# Patient Record
Sex: FEMALE | Race: WHITE | Employment: OTHER | URBAN - METROPOLITAN AREA
[De-identification: names, ages, dates, MRNs, and addresses within clinical notes are randomized per-mention and may not be internally consistent; named-entity substitution may affect disease eponyms.]

---

## 2020-02-18 ENCOUNTER — HOSPITAL ENCOUNTER (OUTPATIENT)
Dept: CT IMAGING | Age: 45
Discharge: HOME | End: 2020-02-18
Payer: COMMERCIAL

## 2020-02-18 DIAGNOSIS — R10.9 FLANK PAIN: ICD-10-CM

## 2020-02-18 DIAGNOSIS — R31.9 HEMATURIA: ICD-10-CM

## 2020-02-18 PROCEDURE — 74178 CT ABD&PLV WO CNTR FLWD CNTR: CPT

## 2020-02-18 RX ORDER — SODIUM CHLORIDE 0.9 % (FLUSH) 0.9 %
10 SYRINGE (ML) INJECTION
Status: COMPLETED | OUTPATIENT
Start: 2020-02-18 | End: 2020-02-18

## 2020-02-18 RX ADMIN — Medication 10 ML: at 13:24

## 2020-03-04 ENCOUNTER — HOSPITAL ENCOUNTER (OUTPATIENT)
Dept: MRI IMAGING | Age: 45
Discharge: HOME | End: 2020-03-04
Payer: COMMERCIAL

## 2020-03-04 DIAGNOSIS — R22.41 MASS OF RIGHT THIGH: ICD-10-CM

## 2020-03-04 PROCEDURE — 73720 MRI LWR EXTREMITY W/O&W/DYE: CPT

## 2020-03-04 RX ORDER — GADOTERATE MEGLUMINE 376.9 MG/ML
14 INJECTION INTRAVENOUS
Status: COMPLETED | OUTPATIENT
Start: 2020-03-04 | End: 2020-03-04

## 2020-03-04 RX ADMIN — GADOTERATE MEGLUMINE 14 ML: 376.9 INJECTION INTRAVENOUS at 11:00

## 2021-05-14 ENCOUNTER — TRANSCRIBE ORDER (OUTPATIENT)
Dept: SCHEDULING | Age: 46
End: 2021-05-14

## 2021-05-14 DIAGNOSIS — M24.851 PARALABRAL CYST OF HIP, RIGHT: Primary | ICD-10-CM

## 2021-05-21 ENCOUNTER — HOSPITAL ENCOUNTER (OUTPATIENT)
Dept: MRI IMAGING | Age: 46
Discharge: HOME | End: 2021-05-21
Payer: COMMERCIAL

## 2021-05-21 DIAGNOSIS — M24.851 PARALABRAL CYST OF HIP, RIGHT: ICD-10-CM

## 2021-05-21 PROCEDURE — 73721 MRI JNT OF LWR EXTRE W/O DYE: CPT | Performed by: ORTHOPAEDIC SURGERY

## 2022-03-01 ENCOUNTER — OFFICE VISIT (OUTPATIENT)
Dept: ORTHOPEDIC SURGERY | Age: 47
End: 2022-03-01
Payer: COMMERCIAL

## 2022-03-01 VITALS — WEIGHT: 150 LBS | HEIGHT: 68 IN | BODY MASS INDEX: 22.73 KG/M2

## 2022-03-01 DIAGNOSIS — M25.512 CHRONIC LEFT SHOULDER PAIN: Primary | ICD-10-CM

## 2022-03-01 DIAGNOSIS — G89.29 CHRONIC LEFT SHOULDER PAIN: Primary | ICD-10-CM

## 2022-03-01 DIAGNOSIS — M75.42 IMPINGEMENT SYNDROME OF LEFT SHOULDER: ICD-10-CM

## 2022-03-01 PROCEDURE — 99203 OFFICE O/P NEW LOW 30 MIN: CPT | Performed by: ORTHOPAEDIC SURGERY

## 2022-03-01 RX ORDER — MELOXICAM 7.5 MG/1
7.5 TABLET ORAL DAILY
Qty: 30 TABLET | Refills: 0 | Status: SHIPPED | OUTPATIENT
Start: 2022-03-01 | End: 2022-10-11

## 2022-03-01 RX ORDER — TRAZODONE HYDROCHLORIDE 50 MG/1
TABLET ORAL
COMMUNITY

## 2022-03-01 NOTE — PATIENT INSTRUCTIONS
From the [id number] [facility]    Rotator Cuff Injury    The rotator cuff is the group of four muscles and tendons that surround the shoulder joint. The four muscles are called the supraspinatus, infraspinatus, subscapularis, and teres minor. The most common tendon to have a problem is the supraspinatus. On top of the rotator cuff is the deltoid muscle. The bones at the shoulder include the humerus, scapula (shoulder blade), and clavicle (collar bone). The rotator cuff muscles start on the shoulder blade and attach around the front, top, and back of the humerus. They help to elevate the arm away from the body in forward, side, and backward motions. They also turn the arm in or out to rotate the shoulder. These muscles and tendons provide strength and stability to the shoulder joint. Above the rotator cuff there is a bursa - a sack of tissue that cushions and protects two surfaces so that they do not rub directly against each other when they move. The rotator cuff bursa protects close contact between bones around the shoulder (Figure 1). When the rotator cuff is injured or damaged, it can lead to inflammation of the bursa, called bursitis. Bursitis can cause pain and loss of motion. Figure 1  Shoulder anatomy, shown here, is important to understand with a rotator cuff injury. Causes    Rotator cuff injuries can result from trauma (such as falls), sports injuries, or motor vehicle accidents. However, most rotator cuff injuries result from natural tendon aging and gradual breakdown of the cuff. Tendinosis is a process where the tendon develops microscopic changes in its collagen and cells without inflammation. In some people this process can cause symptoms. In other people, it may be occurring silently. Gradually, there are enough structural changes that it is easier to tear the weakened tendon doing an routine activity. They are more common in older people for this reason.     Signs and Symptoms    Damage to the rotator cuff is usually called a \"tear. \" Tears can be small or large, and the size and location of the tear determines the symptoms. Symptoms can include:    Pain  Weakness  Limited motion  Catching  Locking  Feeling that the shoulder is not stable  The symptoms are usually worse in certain positions, such as:    Reaching backward to fasten a seat belt  Picking up an object out of the back seat  Picking up a stack of plates out of a cupboard  Certain sports activities can worsen symptoms, including:    Pitching  Throwing  Playing tennis or racquetball  Weight lifting  But not all rotator cuff tears are painful. Many people with rotator cuff injuries have no symptoms. It is not well understood why some people feel nothing and others do. Treatment    HOW ARE ROTATOR CUFF INJURIES DIAGNOSED? The first step to diagnosis is a history and exam by your doctor. It is important for the doctor to identify where your pain is coming from. He or she will do this by asking you about the quality and timing of your pain. Not all shoulder pain is related to a shoulder injury. Some shoulder pain comes from other places, such as the neck or even the heart. If your doctor is concerned about a rotator cuff injury, he or she may order imaging tests. This might start with an x-ray. Although x-rays do not show the rotator cuff muscles, bursa, and tendons, they can diagnose problems with the bone spurs or arthritis of the shoulder. These bony problems may be the source of your pain or could be a cause of a rotator cuff tear. If your doctor is concerned about a rotator cuff tear, the best way to diagnose it is to get an MRI. An MRI will show location and size of tears or inflammation. The MRI can determine how many tendons are affected. It can determine if there is tendinosis where there are changes in the way a tendon looks but the tendon is still connected.  It can show if there is a partial or a complete tear. It can show if the tear is displaced and how far away the tendon is from the expected bone attachment site. It can show if the muscle looks normal or if it has been filled with fatty tissue. This information will help determine proper treatment. Some surgeons will also recommend arthroscopy. Arthroscopy is a surgical procedure that allows the surgeon to use a small camera to see inside the shoulder joint. TREATMENT    Treatment will depend on the location and size of the rotator cuff tear as well as how long ago it was torn. Treatment usually begins with non-surgical treatment. This includes altering your activities so you can use your shoulder in a safer, more comfortable way. Many patients benefit from physical therapy. Your therapist can help you improve mobility and strengthen your shoulder muscles. Anti-inflammatory medications and steroid injections (also known as a cortisone shot) directly into the shoulder can help with pain relief. If these treatments fail, or the tear is very new, you may benefit from surgery. Arthroscopy, which uses a small camera and instruments, can be used to remove scar tissue, remove loose bodies, debride the tendon, remove inflamed joint lining, release tight tissue, and repair rotator cuff tears. In many cases it is helpful to identify a tear early after it occurs. They longer the tendon is detached from the bone, the harder it is to repair the tendon if surgery is indicated. It takes more work and force to move a displaced and shortened tendon back to where it is supposed to attach to the humerus. This can affect repair success. When the tendon is no longer attached to the bone, the muscle cannot lengthen. When the muscle remains shortened and does not see normal forces, it can lose its contraction ability and much of the muscle mass becomes filled by fatty tissue instead. When the muscle has irreversible changes it can no longer be repaired.     Secondary procedures may then be needed to help with pain. This will leave someone with weakness and loss of motion. What can be done if the patient desires better motion and strength, but they have a tendon that cannot be repaired? They may be a candidate for tendon transfer surgery. Tendon transfers borrow a working muscle and tendon and use them to replace the lost rotator cuff function. This means they no longer perform their original function, so there are some side effects. This works better than no treatment at all but is not as good as being able to repair the original injured tendon. This also needs a healthy joint cartilage. Joint replacement surgery may be an option for those with shoulder arthritis and large tears that cannot be repaired. REHABILITATION    Therapy is a critical part of recovery after rotator cuff surgery. Your therapy regimen will depend on which surgery you have. Therapy can last from three to 12 months. It may require special types of slings and supports to wear early after surgery. Many shoulder procedures can take several months to get back toward your normal lifestyle. A coordinated effort between the patient, surgeon and physical or occupational therapist is necessary for the best result.

## 2022-03-01 NOTE — PROGRESS NOTES
Lianna Mahoney (: 1975) is a 48 y.o. female patient here for evaluation of the following chief complaint(s):  Shoulder Pain (left shoulder pain x 6 months, hurts to extend arm above head. )       ASSESSMENT/PLAN:  Below is the assessment and plan developed based on review of pertinent history, physical exam, labs, studies, and medications. 1. Chronic left shoulder pain  -     XR SHOULDER LT AP/LAT MIN 2 V; Future  2. Impingement syndrome of left shoulder  -     meloxicam (MOBIC) 7.5 mg tablet; Take 1 Tablet by mouth daily. Take with food, Normal, Disp-30 Tablet, R-[de-identified]     78-year-old female with left shoulder impingement and subacromial bursitis. We discussed treatment options and she was given a handout on the problem. At this point she would like to begin with conservative management and take Mobic daily which I prescribed to her. If no better she will call for either physical therapy referral or corticosteroid injection. Patient verbalized understanding and elected to proceed. All questions were answered to the patient's apparent satisfaction. SUBJECTIVE/OBJECTIVE:  HPI  78-year-old female with left shoulder pain. Denies any injury. Worse with overhead activity. Hurts at night when sleeping. She has tried over-the-counter medications as well as activity modification and pain persists. Patient reports a gradual onset of symptoms. Duration of problem 6 months. Symptom Severity 8/10  Symptom Frequency intermittent        Allergies   Allergen Reactions    Amoxicillin Unknown (comments)       Current Outpatient Medications   Medication Sig    traZODone (DESYREL) 50 mg tablet trazodone 50 mg tablet   TAKE 1 TABLET BY MOUTH EVERY DAY AT BEDTIME AS NEEDED    meloxicam (MOBIC) 7.5 mg tablet Take 1 Tablet by mouth daily. Take with food    ibuprofen (MOTRIN) 200 mg tablet Take  by mouth every six (6) hours as needed.     oxyCODONE-acetaminophen (PERCOCET) 5-325 mg per tablet Take 1 Tab by mouth every four (4) hours as needed for Pain. (Patient not taking: Reported on 3/1/2022)    ALBUTEROL SULFATE (PROVENTIL IN) Take  by inhalation as needed. (Patient not taking: Reported on 3/1/2022)     No current facility-administered medications for this visit. Social History     Socioeconomic History    Marital status:      Spouse name: Not on file    Number of children: Not on file    Years of education: Not on file    Highest education level: Not on file   Occupational History    Not on file   Tobacco Use    Smoking status: Current Every Day Smoker     Packs/day: 0.25     Years: 20.00     Pack years: 5.00    Smokeless tobacco: Never Used   Substance and Sexual Activity    Alcohol use: Yes     Alcohol/week: 1.7 standard drinks     Types: 2 Shots of liquor per week    Drug use: No    Sexual activity: Not on file   Other Topics Concern    Not on file   Social History Narrative    Not on file     Social Determinants of Health     Financial Resource Strain:     Difficulty of Paying Living Expenses: Not on file   Food Insecurity:     Worried About Running Out of Food in the Last Year: Not on file    Rivas of Food in the Last Year: Not on file   Transportation Needs:     Lack of Transportation (Medical): Not on file    Lack of Transportation (Non-Medical):  Not on file   Physical Activity:     Days of Exercise per Week: Not on file    Minutes of Exercise per Session: Not on file   Stress:     Feeling of Stress : Not on file   Social Connections:     Frequency of Communication with Friends and Family: Not on file    Frequency of Social Gatherings with Friends and Family: Not on file    Attends Gnosticist Services: Not on file    Active Member of Clubs or Organizations: Not on file    Attends Club or Organization Meetings: Not on file    Marital Status: Not on file   Intimate Partner Violence:     Fear of Current or Ex-Partner: Not on file    Emotionally Abused: Not on file    Physically Abused: Not on file    Sexually Abused: Not on file   Housing Stability:     Unable to Pay for Housing in the Last Year: Not on file    Number of Places Lived in the Last Year: Not on file    Unstable Housing in the Last Year: Not on file       Past Surgical History:   Procedure Laterality Date    HX BLADDER SUSPENSION      HX GYN  1995    exploratory lap    HX HERNIA REPAIR      umbilical,ing hernia repair    HX HYSTERECTOMY      HX LAPAROTOMY      HX OTHER SURGICAL      inner thigh excision-vaginal tract       Family History   Problem Relation Age of Onset    Post-op Nausea/Vomiting Mother     Delayed Awakening Mother             Review of Systems    No flowsheet data found. Vitals:  Ht 5' 8\" (1.727 m)   Wt 150 lb (68 kg)   BMI 22.81 kg/m²    Estimated body surface area is 1.81 meters squared as calculated from the following:    Height as of this encounter: 5' 8\" (1.727 m). Weight as of this encounter: 150 lb (68 kg). Body mass index is 22.81 kg/m². Physical Exam    Musculoskeletal Exam:    Left SHOULDER EXAM    ROM:    -  degrees   -  degrees   - ER 80   - IR t12    Strength:    - Supraspinatus 5/5   - Infraspinatus 5/5   - Subscapularis 5/5    Drop arm: Negative    Donnie's Test: Positive    [place] Test: Positive    Neer Test: Negative    AC Joint TTP: Negative    Cross Body Adduction Test: Positive    Speeds Test: Negative    Yergason Test: Negative    Apprehension Test: Negative    Bicipital Groove TTP: Negative      Patient fires AIN, PIN and ulnar nerves. Sensation is grossly intact in the median, radial and ulnar distribution. Hand is pink and appears well-perfused. Hand is warm. Skin is intact. Compartments are soft and compressible.       Consitutional: Healthy  Skin:   - Edema - no  - Cellulitis - No    Neuro: Numbness or tingling in R/L arm: no    Psych: Affect normal    Cardiovascular: Capillary Refill < 2 seconds in upper extremities    Respiratory: Non-Labored Breathing    ROS:    Constitutional: Denies fever/chills    Respiratory: Denies SOB        Imaging:      XR Results (most recent):  Results from Appointment encounter on 03/01/22    XR SHOULDER LT AP/LAT MIN 2 V    Narrative  Left Shoulder Xray:  Indication: pain  Views: 4 views - AP, Grashey, Axillary, Scap-Y  Interpretation:  -4 views of the left shoulder are reviewed and show normal bone architecture. The humerus is well located within the glenoid. There is no arthritis of the glenohumeral joint or acromioclavicular joint. There is no evidence of high riding of the humeral head. No evidence of fracture or subluxation. There is no evidence of soft tissue swelling. Orders Placed This Encounter    XR SHOULDER LT AP/LAT MIN 2 V     Standing Status:   Future     Number of Occurrences:   1     Standing Expiration Date:   3/2/2023     Order Specific Question:   Is Patient Pregnant? Answer:   No    meloxicam (MOBIC) 7.5 mg tablet     Sig: Take 1 Tablet by mouth daily. Take with food     Dispense:  30 Tablet     Refill:  0          An electronic signature was used to authenticate this note.   -- Nicky Mcfadden MD

## 2022-04-06 ENCOUNTER — OFFICE VISIT (OUTPATIENT)
Dept: ORTHOPEDIC SURGERY | Age: 47
End: 2022-04-06
Payer: COMMERCIAL

## 2022-04-06 VITALS — BODY MASS INDEX: 22.73 KG/M2 | WEIGHT: 150 LBS | HEIGHT: 68 IN

## 2022-04-06 DIAGNOSIS — M25.551 RIGHT HIP PAIN: ICD-10-CM

## 2022-04-06 DIAGNOSIS — M21.851 ACQUIRED DYSPLASIA OF RIGHT HIP: Primary | ICD-10-CM

## 2022-04-06 DIAGNOSIS — S73.191A TEAR OF RIGHT ACETABULAR LABRUM, INITIAL ENCOUNTER: ICD-10-CM

## 2022-04-06 PROCEDURE — 99213 OFFICE O/P EST LOW 20 MIN: CPT | Performed by: ORTHOPAEDIC SURGERY

## 2022-04-06 NOTE — PROGRESS NOTES
Kelby Villarreal (: 1975) is a 48 y.o. female, patient, here for evaluation of the following chief complaint(s):  Hip Pain (right hip discomfort - burning feeling for the last two weeks, MRI done last year - showed a cyst, )       HPI:    Complaint right hip pain. Patient has had hip pain for over a year. She had an MRI that was ordered by her [facility] care doctor that was ordered 2021. Showed small labral tear. Also had a ganglion benign-appearing cyst in her abductor muscles. Patient's complaint is right hip pain. Worse with flexion internal rotation positions. She is also having problems with hip flexion. Her symptoms resolved for period time then recurred. Allergies   Allergen Reactions    Amoxicillin Unknown (comments)       Current Outpatient Medications   Medication Sig    mecobalamin (B12 ACTIVE PO) B12    traZODone (DESYREL) 50 mg tablet trazodone 50 mg tablet   TAKE 1 TABLET BY MOUTH EVERY DAY AT BEDTIME AS NEEDED    meloxicam (MOBIC) 7.5 mg tablet Take 1 Tablet by mouth daily. Take with food (Patient not taking: Reported on 2022)    oxyCODONE-acetaminophen (PERCOCET) 5-325 mg per tablet Take 1 Tab by mouth every four (4) hours as needed for Pain. (Patient not taking: Reported on 3/1/2022)    ALBUTEROL SULFATE (PROVENTIL IN) Take  by inhalation as needed. (Patient not taking: Reported on 3/1/2022)    ibuprofen (MOTRIN) 200 mg tablet Take  by mouth every six (6) hours as needed. (Patient not taking: Reported on 2022)     No current facility-administered medications for this visit.        Past Medical History:   Diagnosis Date    Asthma     Hiatal hernia     Nausea & vomiting     Stress         Past Surgical History:   Procedure Laterality Date    HX BLADDER SUSPENSION      HX GYN      exploratory lap    HX HERNIA REPAIR      umbilical,ing hernia repair    HX HYSTERECTOMY      HX LAPAROTOMY      HX OTHER SURGICAL      inner thigh excision-vaginal tract Family History   Problem Relation Age of Onset    Post-op Nausea/Vomiting Mother     Delayed Awakening Mother         Social History     Socioeconomic History    Marital status:      Spouse name: Not on file    Number of children: Not on file    Years of education: Not on file    Highest education level: Not on file   Occupational History    Not on file   Tobacco Use    Smoking status: Current Every Day Smoker     Packs/day: 0.25     Years: 20.00     Pack years: 5.00    Smokeless tobacco: Never Used   Substance and Sexual Activity    Alcohol use: Yes     Alcohol/week: 1.7 standard drinks     Types: 2 Shots of liquor per week    Drug use: No    Sexual activity: Not on file   Other Topics Concern    Not on file   Social History Narrative    Not on file     Social Determinants of Health     Financial Resource Strain:     Difficulty of Paying Living Expenses: Not on file   Food Insecurity:     Worried About Running Out of Food in the Last Year: Not on file    Rivas of Food in the Last Year: Not on file   Transportation Needs:     Lack of Transportation (Medical): Not on file    Lack of Transportation (Non-Medical):  Not on file   Physical Activity:     Days of Exercise per Week: Not on file    Minutes of Exercise per Session: Not on file   Stress:     Feeling of Stress : Not on file   Social Connections:     Frequency of Communication with Friends and Family: Not on file    Frequency of Social Gatherings with Friends and Family: Not on file    Attends Pentecostal Services: Not on file    Active Member of Clubs or Organizations: Not on file    Attends Club or Organization Meetings: Not on file    Marital Status: Not on file   Intimate Partner Violence:     Fear of Current or Ex-Partner: Not on file    Emotionally Abused: Not on file    Physically Abused: Not on file    Sexually Abused: Not on file   Housing Stability:     Unable to Pay for Housing in the Last Year: Not on file  Number of Places Lived in the Last Year: Not on file    Unstable Housing in the Last Year: Not on file       ROS     Positive for: Musculoskeletal    Last edited by Ely Ramos on 4/6/2022  9:36 AM. (History)            Vitals:  Ht 5' 8\" (1.727 m)   Wt 150 lb (68 kg)   BMI 22.81 kg/m²    Body mass index is 22.81 kg/m². PHYSICAL EXAM:  Left lower extremity exam: Hip exam today reveals negative logroll test and negative impingement test.  Hip range of motion is full extension to 115 degrees of flexion. There is no trochanteric tenderness. Hip flexors and abductors have 5 x 5 strength. Patient able to stand on the affected lower extremity with negative Trendelenburg. Extremity has intact quads, ankle plantar flexors, ankle dorsiflexors. Skin is intact. Foot is sensate and well perfused. Straight leg raise and femoral nerve stretch test are negative. Right lower extremity exam: Right hip has a positive impingement test.  Logroll test is negative. Does have pain with resisted hip flexion. There is no trochanteric tenderness. Hip flexors and abductors have 5 x 5 strength. Patient able to stand on the affected lower extremity with negative Trendelenburg. Extremity has intact quads, ankle plantar flexors, ankle dorsiflexors. Skin is intact. Foot is sensate and well perfused. Straight leg raise and femoral nerve stretch test are negative. IMAGING:  XR Results (most recent):  Results from Appointment encounter on 04/06/22    XR HIP RT W OR WO PELV 2-3 VWS    Narrative  Right hip 2 views. Joint space remains well-maintained. Mild retroversion with synovial herniation pit in anterior femoral head neck junction. ASSESSMENT/PLAN:  1. Acquired dysplasia of right hip  -     REFERRAL TO PHYSICAL THERAPY  2. Right hip pain  -     XR HIP RT W OR WO PELV 2-3 VWS; Future  -     XR INJ ASP LARGE JOINT / BURSA; Future  3.  Tear of right acetabular labrum, initial encounter    Multifactorial issue. I think at her age this would best be treated with initiation of conservative treatment. Scheduled her for a hip intra-articular injection and outpatient PT for muscular strengthening. Would recommend that she follow-up if her symptoms are not resolving. She can use anti-inflammatories like Aleve or Motrin. Tylenol is safe for her as well. An electronic signature was used to authenticate this note.   --Criss Fuentes MD

## 2022-04-19 ENCOUNTER — HOSPITAL ENCOUNTER (OUTPATIENT)
Dept: GENERAL RADIOLOGY | Age: 47
Discharge: HOME | End: 2022-04-19
Attending: ORTHOPAEDIC SURGERY
Payer: COMMERCIAL

## 2022-04-19 DIAGNOSIS — M25.551 RIGHT HIP PAIN: ICD-10-CM

## 2022-04-19 PROCEDURE — 74011000636 HC RX REV CODE- 636: Performed by: RADIOLOGY

## 2022-04-19 PROCEDURE — 74011000250 HC RX REV CODE- 250: Performed by: RADIOLOGY

## 2022-04-19 PROCEDURE — 20610 DRAIN/INJ JOINT/BURSA W/O US: CPT

## 2022-04-19 PROCEDURE — 74011250636 HC RX REV CODE- 250/636: Performed by: RADIOLOGY

## 2022-04-19 RX ORDER — TRIAMCINOLONE ACETONIDE 40 MG/ML
40 INJECTION, SUSPENSION INTRA-ARTICULAR; INTRAMUSCULAR
Status: COMPLETED | OUTPATIENT
Start: 2022-04-19 | End: 2022-04-19

## 2022-04-19 RX ORDER — LIDOCAINE HYDROCHLORIDE 20 MG/ML
5 INJECTION, SOLUTION EPIDURAL; INFILTRATION; INTRACAUDAL; PERINEURAL
Status: COMPLETED | OUTPATIENT
Start: 2022-04-19 | End: 2022-04-19

## 2022-04-19 RX ORDER — SODIUM BICARBONATE 42 MG/ML
1 INJECTION, SOLUTION INTRAVENOUS
Status: COMPLETED | OUTPATIENT
Start: 2022-04-19 | End: 2022-04-19

## 2022-04-19 RX ORDER — BUPIVACAINE HYDROCHLORIDE 5 MG/ML
5 INJECTION, SOLUTION EPIDURAL; INTRACAUDAL
Status: COMPLETED | OUTPATIENT
Start: 2022-04-19 | End: 2022-04-19

## 2022-04-19 RX ADMIN — LIDOCAINE HYDROCHLORIDE 5 ML: 20 INJECTION, SOLUTION INTRAVENOUS at 15:06

## 2022-04-19 RX ADMIN — BUPIVACAINE HYDROCHLORIDE 10 ML: 5 INJECTION, SOLUTION EPIDURAL; INTRACAUDAL; PERINEURAL at 15:05

## 2022-04-19 RX ADMIN — IOHEXOL 5 ML: 180 INJECTION INTRAVENOUS at 15:05

## 2022-04-19 RX ADMIN — SODIUM BICARBONATE 5 ML: 42 INJECTION, SOLUTION INTRAVENOUS at 15:06

## 2022-04-19 RX ADMIN — TRIAMCINOLONE ACETONIDE 40 MG: 40 INJECTION, SUSPENSION INTRA-ARTICULAR; INTRAMUSCULAR at 15:05

## 2022-06-23 ENCOUNTER — OFFICE VISIT (OUTPATIENT)
Dept: ORTHOPEDIC SURGERY | Age: 47
End: 2022-06-23
Payer: COMMERCIAL

## 2022-06-23 VITALS — HEIGHT: 68 IN | WEIGHT: 150 LBS | BODY MASS INDEX: 22.73 KG/M2

## 2022-06-23 DIAGNOSIS — S73.191D TEAR OF RIGHT ACETABULAR LABRUM, SUBSEQUENT ENCOUNTER: Primary | ICD-10-CM

## 2022-06-23 DIAGNOSIS — G89.29 CHRONIC RIGHT HIP PAIN: ICD-10-CM

## 2022-06-23 DIAGNOSIS — M25.551 CHRONIC RIGHT HIP PAIN: ICD-10-CM

## 2022-06-23 PROCEDURE — 99213 OFFICE O/P EST LOW 20 MIN: CPT | Performed by: ORTHOPAEDIC SURGERY

## 2022-06-23 NOTE — PROGRESS NOTES
Miriam Myers (: 1975) is a 48 y.o. female, patient, here for evaluation of the following chief complaint(s):  Hip Pain (right hip pain, bursa injection in right hip done 2022, no relief from injection, patient reports hip is \"popping\")       HPI:    Short duration of pain relief from right hip injection. She did have pretty immediate relief and now her hip symptoms have recurred. She has more plagued by the mechanical symptoms. Has pain now with flexion internal rotation maneuvers. Allergies   Allergen Reactions    Amoxicillin Unknown (comments)       Current Outpatient Medications   Medication Sig    mecobalamin (B12 ACTIVE PO) B12    traZODone (DESYREL) 50 mg tablet trazodone 50 mg tablet   TAKE 1 TABLET BY MOUTH EVERY DAY AT BEDTIME AS NEEDED    meloxicam (MOBIC) 7.5 mg tablet Take 1 Tablet by mouth daily. Take with food (Patient not taking: Reported on 2022)    oxyCODONE-acetaminophen (PERCOCET) 5-325 mg per tablet Take 1 Tab by mouth every four (4) hours as needed for Pain. (Patient not taking: Reported on 3/1/2022)    ALBUTEROL SULFATE (PROVENTIL IN) Take  by inhalation as needed. (Patient not taking: Reported on 3/1/2022)    ibuprofen (MOTRIN) 200 mg tablet Take  by mouth every six (6) hours as needed. (Patient not taking: Reported on 2022)     No current facility-administered medications for this visit.        Past Medical History:   Diagnosis Date    Asthma     Hiatal hernia     Nausea & vomiting     Stress         Past Surgical History:   Procedure Laterality Date    HX BLADDER SUSPENSION      HX GYN      exploratory lap    HX HERNIA REPAIR      umbilical,ing hernia repair    HX HYSTERECTOMY      HX LAPAROTOMY      HX OTHER SURGICAL      inner thigh excision-vaginal tract       Family History   Problem Relation Age of Onset    Post-op Nausea/Vomiting Mother     Delayed Awakening Mother         Social History     Socioeconomic History    Marital status:      Spouse name: Not on file    Number of children: Not on file    Years of education: Not on file    Highest education level: Not on file   Occupational History    Not on file   Tobacco Use    Smoking status: Current Every Day Smoker     Packs/day: 0.25     Years: 20.00     Pack years: 5.00    Smokeless tobacco: Never Used   Substance and Sexual Activity    Alcohol use: Yes     Alcohol/week: 1.7 standard drinks     Types: 2 Shots of liquor per week    Drug use: No    Sexual activity: Not on file   Other Topics Concern    Not on file   Social History Narrative    Not on file     Social Determinants of Health     Financial Resource Strain:     Difficulty of Paying Living Expenses: Not on file   Food Insecurity:     Worried About Running Out of Food in the Last Year: Not on file    Rivas of Food in the Last Year: Not on file   Transportation Needs:     Lack of Transportation (Medical): Not on file    Lack of Transportation (Non-Medical):  Not on file   Physical Activity:     Days of Exercise per Week: Not on file    Minutes of Exercise per Session: Not on file   Stress:     Feeling of Stress : Not on file   Social Connections:     Frequency of Communication with Friends and Family: Not on file    Frequency of Social Gatherings with Friends and Family: Not on file    Attends Yarsanism Services: Not on file    Active Member of [address] [place] [address] or Organizations: Not on file    Attends Club or Organization Meetings: Not on file    Marital Status: Not on file   Intimate Partner Violence:     Fear of Current or Ex-Partner: Not on file    Emotionally Abused: Not on file    Physically Abused: Not on file    Sexually Abused: Not on file   Housing Stability:     Unable to Pay for Housing in the Last Year: Not on file    Number of [place] in the Last Year: Not on file    Unstable Housing in the Last Year: Not on file       ROS     Positive for: Musculoskeletal    Last edited by Charla Sherry Quintero on 6/23/2022  3:09 PM. (History)            Vitals:  Ht 5' 8\" (1.727 m)   Wt 150 lb (68 kg)   BMI 22.81 kg/m²    Body mass index is 22.81 kg/m². PHYSICAL EXAM:  Right hip exam reveals positive pain with flexion internal rotation maneuvers. Impingement test is positive. IMAGING:  Reviewed old studies no new studies today. She has a labral tear with some degeneration at the labral acetabular junction and some mild cartilage thinning. ASSESSMENT/PLAN:  1. Tear of right acetabular labrum, subsequent encounter  2. Chronic right hip pain    The fact that she had some symptomatic relief with the injection is a good predictor that the hip joint is the cause of her symptoms. Symptoms have recurred. Reviewed her MRI from prior which shows labral tear with some hip degeneration. We discussed options. I really think it is premature to consider a hip replacement. Patient feels that she may need to do something and a good interim step would be an arthroscopic surgery. She is tonus stage I so still a candidate for arthroscopic surgery. We discussed the surgery and recovery as well as the limitations of the surgery and dealing with degeneration. She understands that as patients age there is a higher chance that she will go on to needing a hip replacement sooner after arthroscopic surgery. All questions were answered. She can call back if she would like to discuss this further or schedule her surgical procedure. An electronic signature was used to authenticate this note.   --Joey Layton MD

## 2022-06-23 NOTE — LETTER
6/23/2022    Patient: Kelby Villarreal   YOB: 1975   Date of Visit: 4/52/2214     Rick Leonardo MD  [address] [place] [address] [address] [id number]  Via Fax: [phone]    Dear Rick Leonardo MD,      Thank you for referring Ms. Candace Drake to [facility] for evaluation. My notes for this consultation are attached. If you have questions, please do not hesitate to call me. I look forward to following your patient along with you.       Sincerely,    Michael Corrales MD

## 2022-10-11 ENCOUNTER — OFFICE VISIT (OUTPATIENT)
Dept: ORTHOPEDIC SURGERY | Age: 47
End: 2022-10-11
Payer: COMMERCIAL

## 2022-10-11 VITALS — WEIGHT: 150 LBS | BODY MASS INDEX: 22.73 KG/M2 | HEIGHT: 68 IN

## 2022-10-11 DIAGNOSIS — S52.502A NONDISPLACED FRACTURE OF DISTAL END OF LEFT RADIUS: ICD-10-CM

## 2022-10-11 DIAGNOSIS — M25.532 LEFT WRIST PAIN: Primary | ICD-10-CM

## 2022-10-11 PROCEDURE — 99213 OFFICE O/P EST LOW 20 MIN: CPT | Performed by: ORTHOPAEDIC SURGERY

## 2022-10-11 NOTE — PROGRESS NOTES
Belva Hamman (: 1975) is a 50 y.o. female patient here for evaluation of the following chief complaint(s):  Wrist Pain (Left wrist fx)       ASSESSMENT/PLAN:  Below is the assessment and plan developed based on review of pertinent history, physical exam, labs, studies, and medications. 1. Left wrist pain  -     XR WRIST LT AP/LAT/OBL MIN 3V; Future  2. Nondisplaced fracture of distal end of left radius      I discussed treatment options with the patient and we will proceed with conservative care. She will be immobilized full-time for the next 2 weeks and be nonweightbearing. Follow-up at that time with repeat x-rays 3 views of the left wrist.    In the meantime if she has any issues she can call for sooner follow-up. She can use over-the-counter pain medication for pain control. Patient verbalized understanding and elected to proceed. All questions were answered to the patient's apparent satisfaction. SUBJECTIVE/OBJECTIVE:  HPI    78-year-old female with left wrist injury. She fell on Saturday. [facility] and referred her here. Patient reports a sudden onset of symptoms. Duration of problem 4 days 10/8/2022. Symptom Severity 7/10  Symptom Frequency intermittent        Allergies   Allergen Reactions    Amoxicillin Unknown (comments)       Current Outpatient Medications   Medication Sig    mecobalamin (B12 ACTIVE PO) B12    traZODone (DESYREL) 50 mg tablet trazodone 50 mg tablet   TAKE 1 TABLET BY MOUTH EVERY DAY AT BEDTIME AS NEEDED    ibuprofen (MOTRIN) 200 mg tablet Take  by mouth every six (6) hours as needed. No current facility-administered medications for this visit.        Social History     Socioeconomic History    Marital status:      Spouse name: Not on file    Number of children: Not on file    Years of education: Not on file    Highest education level: Not on file   Occupational History    Not on file Tobacco Use    Smoking status: Every Day     Packs/day: 0.25     Years: 20.00     Pack years: 5.00     Types: Cigarettes    Smokeless tobacco: Never   Substance and Sexual Activity    Alcohol use: Yes     Alcohol/week: 1.7 standard drinks     Types: 2 Shots of liquor per week    Drug use: No    Sexual activity: Not on file   Other Topics Concern    Not on file   Social History Narrative    Not on file     Social Determinants of Health     Financial Resource Strain: Not on file   Food Insecurity: Not on file   Transportation Needs: Not on file   Physical Activity: Not on file   Stress: Not on file   Social Connections: Not on file   Intimate Partner Violence: Not on file   Housing Stability: Not on file       Past Surgical History:   Procedure Laterality Date    HX [address]    exploratory lap    HX HERNIA REPAIR      umbilical,ing hernia repair    HX HYSTERECTOMY      HX LAPAROTOMY      HX OTHER SURGICAL      inner thigh excision-vaginal tract       Family History   Problem Relation Age of Onset    Post-op Nausea/Vomiting Mother     Delayed Awakening Mother             Review of Systems    No flowsheet data found. Vitals:  Ht 5' 8\" (1.727 m)   Wt 150 lb (68 kg)   BMI 22.81 kg/m²    Estimated body surface area is 1.81 meters squared as calculated from the following:    Height as of this encounter: 5' 8\" (1.727 m). Weight as of this encounter: 150 lb (68 kg). Body mass index is 22.81 kg/m². Physical Exam    Musculoskeletal Exam:    Left Upper Extremity EXAMINATION    Patient has tenderness to palpation at the distal radial metaphysis and over Maria L's tubercle. No tenderness over the ulna. There is evidence of a ganglion cyst which is formed on the dorsum of the wrist measuring approximately 2 x 1 cm. Patient fires AIN, PIN and ulnar nerves. Sensation is grossly intact in the median, radial and ulnar distribution. Hand is pink and appears well-perfused.   Hand is warm.  Skin is intact. Compartments are soft and compressible. Consitutional: Healthy  Skin:   - Edema - mild  - Cellulitis - No    Neuro: Numbness or tingling in R/L arm: No    Psych: Affect normal    Cardiovascular: Capillary Refill < 2 seconds in upper extremities    Respiratory: Non-Labored Breathing    ROS:    Constitutional: Denies fever/chills    Respiratory: Denies SOB        Imaging:    XR Results (most recent):  Results from Appointment encounter on 10/11/22    XR WRIST LT AP/LAT/OBL MIN 3V    Narrative  Left Wrist Xray  Indication: pain  Views: 3 views, AP/LAT/OBL    Interpretation: 3 views of the wrist are reviewed and show a nondisplaced fracture of the distal radial metaphysis extending out dorsally on the lateral image. The radiocarpal, midcarpal and scapholunate relationships are normal.  There is no evidence of soft tissue swelling. Orders Placed This Encounter    XR WRIST LT AP/LAT/OBL MIN 3V     3 views     Standing Status:   Future     Number of Occurrences:   1     Standing Expiration Date:   10/12/2023     Order Specific Question:   Is Patient Pregnant? Answer:   No        Procedures:    Patient was rewrapped in the splint which she came in. An electronic signature was used to authenticate this note.   -- Delta Arteaga MD

## 2022-10-26 ENCOUNTER — OFFICE VISIT (OUTPATIENT)
Dept: ORTHOPEDIC SURGERY | Age: 47
End: 2022-10-26
Payer: COMMERCIAL

## 2022-10-26 VITALS — WEIGHT: 150 LBS | BODY MASS INDEX: 22.73 KG/M2 | HEIGHT: 68 IN

## 2022-10-26 DIAGNOSIS — M16.11 PRIMARY LOCALIZED OSTEOARTHRITIS OF RIGHT HIP: ICD-10-CM

## 2022-10-26 DIAGNOSIS — M25.551 RIGHT HIP PAIN: ICD-10-CM

## 2022-10-26 DIAGNOSIS — S73.191D TEAR OF RIGHT ACETABULAR LABRUM, SUBSEQUENT ENCOUNTER: Primary | ICD-10-CM

## 2022-10-26 PROCEDURE — 99214 OFFICE O/P EST MOD 30 MIN: CPT | Performed by: ORTHOPAEDIC SURGERY

## 2022-11-23 DIAGNOSIS — M16.11 PRIMARY LOCALIZED OSTEOARTHRITIS OF RIGHT HIP: Primary | ICD-10-CM

## 2023-01-05 ENCOUNTER — OFFICE VISIT (OUTPATIENT)
Dept: ORTHOPEDIC SURGERY | Age: 48
End: 2023-01-05
Payer: COMMERCIAL

## 2023-01-05 VITALS — BODY MASS INDEX: 22.73 KG/M2 | WEIGHT: 150 LBS | HEIGHT: 68 IN

## 2023-01-05 DIAGNOSIS — M16.11 PRIMARY LOCALIZED OSTEOARTHRITIS OF RIGHT HIP: Primary | ICD-10-CM

## 2023-01-05 PROCEDURE — 99213 OFFICE O/P EST LOW 20 MIN: CPT | Performed by: ORTHOPAEDIC SURGERY

## 2023-01-05 NOTE — PROGRESS NOTES
Rene Taylor (: 1975) is a 50 y.o. female, patient, here for evaluation of the following chief complaint(s):  Hip Pain (Right hip surgery talk )       HPI:    Patient has decided not to have her hip replaced and is going to live with her problem for now. Allergies   Allergen Reactions    Amoxicillin Unknown (comments)       Current Outpatient Medications   Medication Sig    mecobalamin (B12 ACTIVE PO) B12    traZODone (DESYREL) 50 mg tablet trazodone 50 mg tablet   TAKE 1 TABLET BY MOUTH EVERY DAY AT BEDTIME AS NEEDED    ibuprofen (MOTRIN) 200 mg tablet Take  by mouth every six (6) hours as needed. No current facility-administered medications for this visit.        Past Medical History:   Diagnosis Date    Asthma     Hiatal hernia     Nausea & vomiting     Stress         Past Surgical History:   Procedure Laterality Date    HX BLADDER SUSPENSION      HX GYN      exploratory lap    HX HERNIA REPAIR      umbilical,ing hernia repair    HX HYSTERECTOMY      HX LAPAROTOMY      HX OTHER SURGICAL      inner thigh excision-vaginal tract       Family History   Problem Relation Age of Onset    Post-op Nausea/Vomiting Mother     Delayed Awakening Mother     OSTEOARTHRITIS Mother     Elevated Lipids Mother     Gall Bladder Disease Mother     Hypertension Mother     Stroke Mother     Alcohol abuse Father     Elevated Lipids Father     Heart Disease Father     Hypertension Father     Cancer Maternal Grandfather         Colon    OSTEOARTHRITIS Maternal Grandmother     Cancer Maternal Grandmother         Colon    Hypertension Maternal Grandmother     Heart Disease Paternal Grandfather     Diabetes Paternal Grandmother     Cancer Maternal Uncle         Non hodgkins [email or web address]    Cancer Paternal Uncle         Brain    Heart Disease Paternal Uncle     Heart Disease Paternal Uncle         Social History     Socioeconomic History    Marital status:      Spouse name: Not on file    Number of children: Not on file    Years of education: Not on file    Highest education level: Not on file   Occupational History    Not on file   Tobacco Use    Smoking status: Some Days     Packs/day: 0.25     Years: 20.00     Pack years: 5.00     Types: Cigarettes    Smokeless tobacco: Never   Substance and Sexual Activity    Alcohol use: Not Currently     Alcohol/week: 2.0 standard drinks     Types: 2 Shots of liquor per week    Drug use: No    Sexual activity: Not on file   Other Topics Concern    Not on file   Social History Narrative    Not on file     Social Determinants of Health     Financial Resource Strain: Not on file   Food Insecurity: Not on file   Transportation Needs: Not on file   Physical Activity: Not on file   Stress: Not on file   Social Connections: Not on file   Intimate Partner Violence: Not on file   Housing Stability: Not on file       ROS    Positive for: Musculoskeletal  Last edited by Reji Brown on 1/5/2023  2:17 PM.            Vitals:  Ht 5' 8\" (1.727 m)   Wt 150 lb (68 kg)   BMI 22.81 kg/m²    Body mass index is 22.81 kg/m². ASSESSMENT/PLAN:  1. Primary localized osteoarthritis of right hip  Patient can at any time call us back. At this point we will take her off the surgery schedule. An electronic signature was used to authenticate this note.   --Darling Cross MD

## 2024-07-30 ENCOUNTER — TELEPHONE (OUTPATIENT)
Dept: PRIMARY CARE CLINIC | Facility: CLINIC | Age: 49
End: 2024-07-30

## 2025-02-14 ENCOUNTER — TRANSCRIBE ORDERS (OUTPATIENT)
Facility: HOSPITAL | Age: 50
End: 2025-02-14

## 2025-02-14 DIAGNOSIS — R31.9 HEMATURIA, UNSPECIFIED TYPE: Primary | ICD-10-CM
